# Patient Record
Sex: MALE | Race: WHITE | NOT HISPANIC OR LATINO | Employment: STUDENT | URBAN - METROPOLITAN AREA
[De-identification: names, ages, dates, MRNs, and addresses within clinical notes are randomized per-mention and may not be internally consistent; named-entity substitution may affect disease eponyms.]

---

## 2023-04-21 ENCOUNTER — TELEPHONE (OUTPATIENT)
Dept: FAMILY MEDICINE | Facility: CLINIC | Age: 28
End: 2023-04-21

## 2023-04-21 NOTE — TELEPHONE ENCOUNTER
----- Message from Myra Randle sent at 4/21/2023  2:03 PM CDT -----  Contact: Jake  Patient is calling to speak with a nurse regarding medication. Patient request to be informed if sublocade ER injection is prescribed by any provider. Patient reports being without insurance. Please give patient a call back at [phone] to discuss further.   Thank you,  GH